# Patient Record
Sex: MALE | Race: WHITE | NOT HISPANIC OR LATINO | Employment: OTHER | URBAN - METROPOLITAN AREA
[De-identification: names, ages, dates, MRNs, and addresses within clinical notes are randomized per-mention and may not be internally consistent; named-entity substitution may affect disease eponyms.]

---

## 2017-08-07 ENCOUNTER — APPOINTMENT (OUTPATIENT)
Dept: RADIOLOGY | Facility: MEDICAL CENTER | Age: 72
DRG: 378 | End: 2017-08-07
Attending: EMERGENCY MEDICINE
Payer: MEDICARE

## 2017-08-07 ENCOUNTER — RESOLUTE PROFESSIONAL BILLING HOSPITAL PROF FEE (OUTPATIENT)
Dept: HOSPITALIST | Facility: MEDICAL CENTER | Age: 72
End: 2017-08-07
Payer: MEDICARE

## 2017-08-07 ENCOUNTER — HOSPITAL ENCOUNTER (INPATIENT)
Facility: MEDICAL CENTER | Age: 72
LOS: 2 days | DRG: 378 | End: 2017-08-09
Attending: EMERGENCY MEDICINE | Admitting: HOSPITALIST
Payer: MEDICARE

## 2017-08-07 DIAGNOSIS — K92.1 MELENA: ICD-10-CM

## 2017-08-07 DIAGNOSIS — K92.2 GASTROINTESTINAL HEMORRHAGE, UNSPECIFIED GASTROINTESTINAL HEMORRHAGE TYPE: ICD-10-CM

## 2017-08-07 PROBLEM — I10 HTN (HYPERTENSION): Status: ACTIVE | Noted: 2017-08-07

## 2017-08-07 PROBLEM — E78.5 DYSLIPIDEMIA: Status: ACTIVE | Noted: 2017-08-07

## 2017-08-07 PROBLEM — D62 ANEMIA DUE TO BLOOD LOSS, ACUTE: Status: ACTIVE | Noted: 2017-08-07

## 2017-08-07 LAB
ABO GROUP BLD: NORMAL
ABO GROUP BLD: NORMAL
ALBUMIN SERPL BCP-MCNC: 3.3 G/DL (ref 3.2–4.9)
ALBUMIN/GLOB SERPL: 1.4 G/DL
ALP SERPL-CCNC: 54 U/L (ref 30–99)
ALT SERPL-CCNC: 11 U/L (ref 2–50)
ANION GAP SERPL CALC-SCNC: 6 MMOL/L (ref 0–11.9)
APTT PPP: 21 SEC (ref 24.7–36)
AST SERPL-CCNC: 17 U/L (ref 12–45)
BARCODED ABORH UBTYP: 6200
BARCODED PRD CODE UBPRD: NORMAL
BARCODED UNIT NUM UBUNT: NORMAL
BASOPHILS # BLD AUTO: 0.3 % (ref 0–1.8)
BASOPHILS # BLD: 0.03 K/UL (ref 0–0.12)
BILIRUB SERPL-MCNC: 0.4 MG/DL (ref 0.1–1.5)
BLD GP AB SCN SERPL QL: NORMAL
BUN SERPL-MCNC: 34 MG/DL (ref 8–22)
CALCIUM SERPL-MCNC: 9.2 MG/DL (ref 8.5–10.5)
CHLORIDE SERPL-SCNC: 106 MMOL/L (ref 96–112)
CO2 SERPL-SCNC: 25 MMOL/L (ref 20–33)
COMPONENT R 8504R: NORMAL
CREAT SERPL-MCNC: 0.94 MG/DL (ref 0.5–1.4)
EOSINOPHIL # BLD AUTO: 0.11 K/UL (ref 0–0.51)
EOSINOPHIL NFR BLD: 1.1 % (ref 0–6.9)
ERYTHROCYTE [DISTWIDTH] IN BLOOD BY AUTOMATED COUNT: 45.5 FL (ref 35.9–50)
GFR SERPL CREATININE-BSD FRML MDRD: >60 ML/MIN/1.73 M 2
GLOBULIN SER CALC-MCNC: 2.4 G/DL (ref 1.9–3.5)
GLUCOSE SERPL-MCNC: 108 MG/DL (ref 65–99)
HCT VFR BLD AUTO: 21.8 % (ref 42–52)
HCT VFR BLD AUTO: 22.8 % (ref 42–52)
HGB BLD-MCNC: 7.1 G/DL (ref 14–18)
HGB BLD-MCNC: 7.4 G/DL (ref 14–18)
IMM GRANULOCYTES # BLD AUTO: 0.04 K/UL (ref 0–0.11)
IMM GRANULOCYTES NFR BLD AUTO: 0.4 % (ref 0–0.9)
INR PPP: 1.08 (ref 0.87–1.13)
LIPASE SERPL-CCNC: 31 U/L (ref 11–82)
LYMPHOCYTES # BLD AUTO: 2.12 K/UL (ref 1–4.8)
LYMPHOCYTES NFR BLD: 20.4 % (ref 22–41)
MCH RBC QN AUTO: 30.5 PG (ref 27–33)
MCHC RBC AUTO-ENTMCNC: 32.5 G/DL (ref 33.7–35.3)
MCV RBC AUTO: 93.8 FL (ref 81.4–97.8)
MONOCYTES # BLD AUTO: 0.97 K/UL (ref 0–0.85)
MONOCYTES NFR BLD AUTO: 9.3 % (ref 0–13.4)
NEUTROPHILS # BLD AUTO: 7.11 K/UL (ref 1.82–7.42)
NEUTROPHILS NFR BLD: 68.5 % (ref 44–72)
NRBC # BLD AUTO: 0 K/UL
NRBC BLD AUTO-RTO: 0 /100 WBC
PLATELET # BLD AUTO: 247 K/UL (ref 164–446)
PMV BLD AUTO: 10.5 FL (ref 9–12.9)
POTASSIUM SERPL-SCNC: 4 MMOL/L (ref 3.6–5.5)
PRODUCT TYPE UPROD: NORMAL
PROT SERPL-MCNC: 5.7 G/DL (ref 6–8.2)
PROTHROMBIN TIME: 14.3 SEC (ref 12–14.6)
RBC # BLD AUTO: 2.43 M/UL (ref 4.7–6.1)
RH BLD: NORMAL
SODIUM SERPL-SCNC: 137 MMOL/L (ref 135–145)
UNIT STATUS USTAT: NORMAL
WBC # BLD AUTO: 10.4 K/UL (ref 4.8–10.8)

## 2017-08-07 PROCEDURE — 700111 HCHG RX REV CODE 636 W/ 250 OVERRIDE (IP): Performed by: HOSPITALIST

## 2017-08-07 PROCEDURE — 85018 HEMOGLOBIN: CPT

## 2017-08-07 PROCEDURE — 700111 HCHG RX REV CODE 636 W/ 250 OVERRIDE (IP): Performed by: INTERNAL MEDICINE

## 2017-08-07 PROCEDURE — 83690 ASSAY OF LIPASE: CPT

## 2017-08-07 PROCEDURE — 96365 THER/PROPH/DIAG IV INF INIT: CPT

## 2017-08-07 PROCEDURE — 36415 COLL VENOUS BLD VENIPUNCTURE: CPT

## 2017-08-07 PROCEDURE — 700105 HCHG RX REV CODE 258: Performed by: HOSPITALIST

## 2017-08-07 PROCEDURE — 85610 PROTHROMBIN TIME: CPT

## 2017-08-07 PROCEDURE — 86901 BLOOD TYPING SEROLOGIC RH(D): CPT

## 2017-08-07 PROCEDURE — 85025 COMPLETE CBC W/AUTO DIFF WBC: CPT

## 2017-08-07 PROCEDURE — C9113 INJ PANTOPRAZOLE SODIUM, VIA: HCPCS | Performed by: INTERNAL MEDICINE

## 2017-08-07 PROCEDURE — 85730 THROMBOPLASTIN TIME PARTIAL: CPT

## 2017-08-07 PROCEDURE — 99285 EMERGENCY DEPT VISIT HI MDM: CPT

## 2017-08-07 PROCEDURE — 86850 RBC ANTIBODY SCREEN: CPT

## 2017-08-07 PROCEDURE — 770020 HCHG ROOM/CARE - TELE (206)

## 2017-08-07 PROCEDURE — 700105 HCHG RX REV CODE 258: Performed by: INTERNAL MEDICINE

## 2017-08-07 PROCEDURE — 99223 1ST HOSP IP/OBS HIGH 75: CPT | Mod: AI | Performed by: HOSPITALIST

## 2017-08-07 PROCEDURE — 86900 BLOOD TYPING SEROLOGIC ABO: CPT

## 2017-08-07 PROCEDURE — C9113 INJ PANTOPRAZOLE SODIUM, VIA: HCPCS | Performed by: HOSPITALIST

## 2017-08-07 PROCEDURE — 80053 COMPREHEN METABOLIC PANEL: CPT

## 2017-08-07 PROCEDURE — 85014 HEMATOCRIT: CPT

## 2017-08-07 PROCEDURE — 71010 DX-CHEST-LIMITED (1 VIEW): CPT

## 2017-08-07 RX ORDER — LISINOPRIL 10 MG/1
10 TABLET ORAL DAILY
COMMUNITY

## 2017-08-07 RX ORDER — AMOXICILLIN 250 MG
2 CAPSULE ORAL 2 TIMES DAILY
Status: DISCONTINUED | OUTPATIENT
Start: 2017-08-07 | End: 2017-08-09 | Stop reason: HOSPADM

## 2017-08-07 RX ORDER — IBUPROFEN 200 MG
1 CAPSULE ORAL DAILY
COMMUNITY

## 2017-08-07 RX ORDER — ATORVASTATIN CALCIUM 20 MG/1
20 TABLET, FILM COATED ORAL NIGHTLY
COMMUNITY

## 2017-08-07 RX ORDER — ATORVASTATIN CALCIUM 20 MG/1
20 TABLET, FILM COATED ORAL NIGHTLY
Status: DISCONTINUED | OUTPATIENT
Start: 2017-08-07 | End: 2017-08-09 | Stop reason: HOSPADM

## 2017-08-07 RX ORDER — ACETAMINOPHEN 325 MG/1
650 TABLET ORAL
Status: COMPLETED | OUTPATIENT
Start: 2017-08-07 | End: 2017-08-08

## 2017-08-07 RX ORDER — CHLORAL HYDRATE 500 MG
1000 CAPSULE ORAL 2 TIMES DAILY
COMMUNITY

## 2017-08-07 RX ORDER — M-VIT,TX,IRON,MINS/CALC/FOLIC 27MG-0.4MG
1 TABLET ORAL DAILY
COMMUNITY

## 2017-08-07 RX ORDER — POLYETHYLENE GLYCOL 3350 17 G/17G
1 POWDER, FOR SOLUTION ORAL
Status: DISCONTINUED | OUTPATIENT
Start: 2017-08-07 | End: 2017-08-09 | Stop reason: HOSPADM

## 2017-08-07 RX ORDER — DIPHENHYDRAMINE HYDROCHLORIDE 50 MG/ML
25 INJECTION INTRAMUSCULAR; INTRAVENOUS
Status: COMPLETED | OUTPATIENT
Start: 2017-08-07 | End: 2017-08-08

## 2017-08-07 RX ORDER — BISACODYL 10 MG
10 SUPPOSITORY, RECTAL RECTAL
Status: DISCONTINUED | OUTPATIENT
Start: 2017-08-07 | End: 2017-08-09 | Stop reason: HOSPADM

## 2017-08-07 RX ADMIN — SODIUM CHLORIDE 8 MG/HR: 9 INJECTION, SOLUTION INTRAVENOUS at 16:40

## 2017-08-07 RX ADMIN — SODIUM CHLORIDE 80 MG: 9 INJECTION, SOLUTION INTRAVENOUS at 12:58

## 2017-08-07 ASSESSMENT — ENCOUNTER SYMPTOMS
NAUSEA: 0
FEVER: 0
VOMITING: 0
CHILLS: 0

## 2017-08-07 ASSESSMENT — COPD QUESTIONNAIRES
HAVE YOU SMOKED AT LEAST 100 CIGARETTES IN YOUR ENTIRE LIFE: NO/DON'T KNOW
DURING THE PAST 4 WEEKS HOW MUCH DID YOU FEEL SHORT OF BREATH: NONE/LITTLE OF THE TIME
DO YOU EVER COUGH UP ANY MUCUS OR PHLEGM?: NO/ONLY WITH OCCASIONAL COLDS OR INFECTIONS
COPD SCREENING SCORE: 2

## 2017-08-07 ASSESSMENT — LIFESTYLE VARIABLES
ALCOHOL_USE: NO
EVER_SMOKED: NEVER
EVER_SMOKED: NEVER
DO YOU DRINK ALCOHOL: NO

## 2017-08-07 ASSESSMENT — PATIENT HEALTH QUESTIONNAIRE - PHQ9
SUM OF ALL RESPONSES TO PHQ QUESTIONS 1-9: 0
1. LITTLE INTEREST OR PLEASURE IN DOING THINGS: NOT AT ALL
2. FEELING DOWN, DEPRESSED, IRRITABLE, OR HOPELESS: NOT AT ALL
SUM OF ALL RESPONSES TO PHQ9 QUESTIONS 1 AND 2: 0

## 2017-08-07 ASSESSMENT — PAIN SCALES - GENERAL
PAINLEVEL_OUTOF10: 0
PAINLEVEL_OUTOF10: 5
PAINLEVEL_OUTOF10: 3
PAINLEVEL_OUTOF10: 5

## 2017-08-07 NOTE — ED NOTES
Med rec complete per Pt at bedside  [place] and [place] paper Rx  If Pt is discharged with any medication   Allergies reviewed  No ABX In last 30 days

## 2017-08-07 NOTE — IP AVS SNAPSHOT
" Home Care Instructions                                                                                                                  Name:Reyes Orourke  Medical Record Number:[id number]  CSN: [id number]    YOB: 1945   Age: 72 y.o.  Sex: male  HT:1.753 m (5' 9\") WT: 96.1 kg (211 lb 13.8 oz)          Admit Date: 8/7/2017     Discharge Date:   Today's Date: 8/9/2017  Attending Doctor:  Gregory Cotton M.D.                  Allergies:  Review of patient's allergies indicates no known allergies.            Discharge Instructions       Discharge Instructions    Discharged to home by car with relative. Discharged via wheelchair, hospital escort: Yes.  Special equipment needed: Not Applicable    Be sure to schedule a follow-up appointment with your primary care doctor or any specialists as instructed.     Discharge Plan:   Diet Plan: Discussed  Activity Level: Discussed  Confirmed Follow up Appointment: Patient to Call and Schedule Appointment  Confirmed Symptoms Management: Discussed  Medication Reconciliation Updated: Yes  Influenza Vaccine Indication: Patient Refuses    I understand that a diet low in cholesterol, fat, and sodium is recommended for good health. Unless I have been given specific instructions below for another diet, I accept this instruction as my diet prescription.   Other diet: Regular Diet    Special Instructions: None    · Is patient discharged on Warfarin / Coumadin?   No     · Is patient Post Blood Transfusion?  No    Depression / Suicide Risk    As you are discharged from this [facility] facility, it is important to learn how to keep safe from harming yourself.    Recognize the warning signs:  · Abrupt changes in personality, positive or negative- including increase in energy   · Giving away possessions  · Change in eating patterns- significant weight changes-  positive or negative  · Change in sleeping patterns- unable to sleep or sleeping all the time   · Unwillingness or inability " to communicate  · Depression  · Unusual sadness, discouragement and loneliness  · Talk of wanting to die  · Neglect of personal appearance   · Rebelliousness- reckless behavior  · Withdrawal from people/activities they love  · Confusion- inability to concentrate     If you or a loved one observes any of these behaviors or has concerns about self-harm, here's what you can do:  · Talk about it- your feelings and reasons for harming yourself  · Remove any means that you might use to hurt yourself (examples: pills, rope, extension cords, firearm)  · Get professional help from the community (Mental Health, Substance Abuse, psychological counseling)  · Do not be alone:Call your Safe Contact- someone whom you trust who will be there for you.  · Call your local CRISIS HOTLINE 538-0779 or 910-712-6868  · Call your local Children's Mobile Crisis Response Team [place] (698) 762-2115 or www.DwellGreen  · Call the toll free National Suicide Prevention Hotlines   · National Suicide Prevention Lifeline 207-068-ZODE (1190)  · National Hope Line Network 800-SUICIDE (468-4784)          Gastrointestinal Bleeding  Gastrointestinal (GI) bleeding means there is bleeding somewhere along the digestive tract, between the mouth and anus.  CAUSES   There are many different problems that can cause GI bleeding. Possible causes include:  · Esophagitis. This is inflammation, irritation, or swelling of the esophagus.  · Hemorrhoids. These are veins that are full of blood (engorged) in the rectum. They cause pain, inflammation, and may bleed.  · Anal fissures. These are areas of painful tearing which may bleed. They are often caused by passing hard stool.  · Diverticulosis. These are pouches that form on the colon over time, with age, and may bleed significantly.  · Diverticulitis. This is inflammation in areas with diverticulosis. It can cause pain, fever, and bloody stools, although bleeding is rare.  · Polyps and cancer. Colon cancer  often starts out as precancerous polyps.  · Gastritis and ulcers. Bleeding from the upper gastrointestinal tract (near the stomach) may travel through the intestines and produce black, sometimes tarry, often bad smelling stools. In certain cases, if the bleeding is fast enough, the stools may not be black, but red. This condition may be life-threatening.  SYMPTOMS   · Vomiting bright red blood or material that looks like coffee grounds.  · Bloody, black, or tarry stools.  DIAGNOSIS   Your caregiver may diagnose your condition by taking your history and performing a physical exam. More tests may be needed, including:  · X-rays and other imaging tests.  · Esophagogastroduodenoscopy (EGD). This test uses a flexible, lighted tube to look at your esophagus, stomach, and small intestine.  · Colonoscopy. This test uses a flexible, lighted tube to look at your colon.  TREATMENT   Treatment depends on the cause of your bleeding.   · For bleeding from the esophagus, stomach, small intestine, or colon, the caregiver doing your EGD or colonoscopy may be able to stop the bleeding as part of the procedure.  · Inflammation or infection of the colon can be treated with medicines.  · Many rectal problems can be treated with creams, suppositories, or warm baths.  · Surgery is sometimes needed.  · Blood transfusions are sometimes needed if you have lost a lot of blood.  If bleeding is slow, you may be allowed to go home. If there is a lot of bleeding, you will need to stay in the hospital for observation.  HOME CARE INSTRUCTIONS   · Take any medicines exactly as prescribed.  · Keep your stools soft by eating foods that are high in fiber. These foods include whole grains, legumes, fruits, and vegetables. Prunes (1 to 3 a day) work well for many people.  · Drink enough fluids to keep your urine clear or pale yellow.  SEEK IMMEDIATE MEDICAL CARE IF:   · Your bleeding increases.  · You feel lightheaded, weak, or you faint.  · You have  severe cramps in your back or abdomen.  · You pass large blood clots in your stool.  · Your problems are getting worse.  MAKE SURE YOU:   · Understand these instructions.  · Will watch your condition.  · Will get help right away if you are not doing well or get worse.     This information is not intended to replace advice given to you by your health care provider. Make sure you discuss any questions you have with your health care provider.     Document Released: 12/15/2001 Document Revised: 12/04/2013 Document Reviewed: 11/26/2012  [employer] Interactive Patient Education ©2016 [employer]          Follow-up Information     1. Follow up with Rogelio Martell. Schedule an appointment as soon as possible for a visit in 3 weeks.    Contact information    [phone]         Discharge Medication Instructions:    Below are the medications your physician expects you to take upon discharge:    Review all your home medications and newly ordered medications with your doctor and/or pharmacist. Follow medication instructions as directed by your doctor and/or pharmacist.    Please keep your medication list with you and share with your physician.               Medication List      START taking these medications        Instructions    Morning Afternoon Evening Bedtime    amoxicillin 500 MG Caps   Commonly known as:  AMOXIL        Take 2 Caps by mouth 2 times a day.   Dose:  1000 mg                        clarithromycin 500 MG Tabs   Commonly known as:  BIAXIN        Take 1 Tab by mouth 2 times a day.   Dose:  500 mg                        omeprazole 20 MG delayed-release capsule   Last time this was given:  20 mg on 8/9/2017  8:38 AM   Commonly known as:  PRILOSEC        Take 1 Cap by mouth 2 times a day.   Dose:  20 mg                          CONTINUE taking these medications        Instructions    Morning Afternoon Evening Bedtime    atorvastatin 20 MG Tabs   Last time this was given:  20 mg on 8/8/2017  8:59 PM   Commonly  known as:  LIPITOR        Take 20 mg by mouth every evening.   Dose:  20 mg                        Calcium 600 MG Tabs        Take 1 Tab by mouth every day.   Dose:  1 Tab                        fish oil 1000 MG Caps capsule        Take 1,000 mg by mouth 2 Times a Day.   Dose:  1000 mg                        lisinopril 10 MG Tabs   Commonly known as:  PRINIVIL        Take 10 mg by mouth every day.   Dose:  10 mg                        therapeutic multivitamin-minerals Tabs        Take 1 Tab by mouth every day.   Dose:  1 Tab                          STOP taking these medications     aspirin EC 81 MG Tbec   Commonly known as:  ECOTRIN                    Where to Get Your Medications      Information about where to get these medications is not yet available     ! Ask your nurse or doctor about these medications    - amoxicillin 500 MG Caps  - clarithromycin 500 MG Tabs  - omeprazole 20 MG delayed-release capsule            Instructions           Diet / Nutrition:    Follow any diet instructions given to you by your doctor or the dietician, including how much salt (sodium) you are allowed each day.    If you are overweight, talk to your doctor about a weight reduction plan.    Activity:    Remain physically active following your doctor's instructions about exercise and activity.    Rest often.     Any time you become even a little tired or short of breath, SIT DOWN and rest.    Worsening Symptoms:    Report any of the following signs and symptoms to the doctor's office immediately:    *Pain of jaw, arm, or neck  *Chest pain not relieved by medication                               *Dizziness or loss of consciousness  *Difficulty breathing even when at rest   *More tired than usual                                       *Bleeding drainage or swelling of surgical site  *Swelling of feet, ankles, legs or stomach                 *Fever (>100ºF)  *Pink or blood tinged sputum  *Weight gain (3lbs/day or 5lbs /week)              *Shock from internal defibrillator (if applicable)  *Palpitations or irregular heartbeats                *Cool and/or numb extremities    Stroke Awareness    Common Risk Factors for Stroke include:    Age  Atrial Fibrillation  Carotid Artery Stenosis  Diabetes Mellitus  Excessive alcohol consumption  High blood pressure  Overweight   Physical inactivity  Smoking    Warning signs and symptoms of a stroke include:    *Sudden numbness or weakness of the face, arm or leg (especially on one side of the body).  *Sudden confusion, trouble speaking or understanding.  *Sudden trouble seeing in one or both eyes.  *Sudden trouble walking, dizziness, loss of balance or coordination.Sudden severe headache with no known cause.    It is very important to get treatment quickly when a stroke occurs. If you experience any of the above warning signs, call 911 immediately.                   Disclaimer         Quit Smoking / Tobacco Use:    I understand the use of any tobacco products increases my chance of suffering from future heart disease or stroke and could cause other illnesses which may shorten my life. Quitting the use of tobacco products is the single most important thing I can do to improve my health. For further information on smoking / tobacco cessation call a Toll Free Quit Line at 1-663.459.3205 (*National [employer] [place]) or 1-453.292.3253 (American Lung Association) or you can access the web based program at www.lungusa.org.    [place] Tobacco Users Help Line:  (788) 197-9343       Toll Free: 1-794.213.7065  Quit Tobacco Program [facility] (481)095-2899    Crisis Hotline:    [place] Crisis Hotline:  6-419-QXDEACA or 1-211.404.5097    [place] Crisis Hotline:    1-236.588.3939 or 570-708-0874    Discharge Survey:   Thank you for choosing [facility]. We hope we did everything we could to make your hospital stay a pleasant one. You may be receiving a phone survey and we would appreciate your time  and participation in answering the questions. Your input is very valuable to us in our efforts to improve our service to our patients and their families.        My signature on this form indicates that:    1. I have reviewed and understand the above information.  2. My questions regarding this information have been answered to my satisfaction.  3. I have formulated a plan with my discharge nurse to obtain my prescribed medications for home.                  Disclaimer         __________________________________                     __________       ________                       Patient Signature                                                 Date                    Time

## 2017-08-07 NOTE — IP AVS SNAPSHOT
" <p align=\"LEFT\"><IMG SRC=\"[email or web address]\" alt=\"Image\" WIDTH=\"50%\" HEIGHT=\"200\" BORDER=\"\"></p>                   Name:Reyes Orourke  Medical Record Number:[id number]  CSN: [id number]    YOB: 1945   Age: 72 y.o.  Sex: male  HT:1.753 m (5' 9\") WT: 96.1 kg (211 lb 13.8 oz)          Admit Date: 8/7/2017     Discharge Date:   Today's Date: 8/9/2017  Attending Doctor:  Gregory Cotton M.D.                  Allergies:  Review of patient's allergies indicates no known allergies.          Follow-up Information     1. Follow up with Rogelio Martell. Schedule an appointment as soon as possible for a visit in 3 weeks.    Contact information    [phone]         Medication List      Take these Medications        Instructions    amoxicillin 500 MG Caps   Commonly known as:  AMOXIL    Take 2 Caps by mouth 2 times a day.   Dose:  1000 mg       atorvastatin 20 MG Tabs   Commonly known as:  LIPITOR    Take 20 mg by mouth every evening.   Dose:  20 mg       Calcium 600 MG Tabs    Take 1 Tab by mouth every day.   Dose:  1 Tab       clarithromycin 500 MG Tabs   Commonly known as:  BIAXIN    Take 1 Tab by mouth 2 times a day.   Dose:  500 mg       fish oil 1000 MG Caps capsule    Take 1,000 mg by mouth 2 Times a Day.   Dose:  1000 mg       lisinopril 10 MG Tabs   Commonly known as:  PRINIVIL    Take 10 mg by mouth every day.   Dose:  10 mg       omeprazole 20 MG delayed-release capsule   Commonly known as:  PRILOSEC    Take 1 Cap by mouth 2 times a day.   Dose:  20 mg       therapeutic multivitamin-minerals Tabs    Take 1 Tab by mouth every day.   Dose:  1 Tab         "

## 2017-08-07 NOTE — ASSESSMENT & PLAN NOTE
With black stools and the recent addition of Aleve now with a hemoglobin of 7. You have serial hemoglobins and an EGD in the morning. Place on IV Protonix. He will be transfused for hemoglobin of less than 7 or if he is symptomatic.

## 2017-08-07 NOTE — ED NOTES
Pt ambulates to triage  Chief Complaint   Patient presents with   • Bloody Stools     dark black for 1 wk   • Dizziness   pt visiting for hot august nights  Takes a baby asa daily  Pt asked to wait in lobby, pt updated on triage process and pt asked to inform RN of any changes.

## 2017-08-07 NOTE — ED NOTES
Pt & wife updated on plan of care to be admitted.  Resting comfortably without complaints, awaiting admission.

## 2017-08-07 NOTE — ED PROVIDER NOTES
"ED Provider Note    Scribed for Peterson Walsh M.D. by Jennifer Daniels. 8/7/2017  10:06 AM    Primary care provider: None  Means of arrival: Walk-in  History obtained from: Patient  History limited by: None    CHIEF COMPLAINT  Chief Complaint   Patient presents with   • Bloody Stools     dark black for 1 wk   • Dizziness       HPI  Reyes Orourke is a 72 y.o. male who presents to the Emergency Department for evaluation of bloody stools that have occurred for one week. The patient confirms he has had 4 episodes of black tarry stools in the last week. He has associated symptom of lightheadedness and experienced a near syncope episode two days ago. The patient recently saw a doctor for knee pain, but was diagnosed with a GI bleed. He was prescribed Aleve for treatment. The patient denies nausea, vomiting, and abdominal pain.     REVIEW OF SYSTEMS  Pertinent positives include bloody stools, lightheadedness, near syncope.   Pertinent negatives include no nausea, vomiting, or abdominal pain.    All other systems reviewed and negative.    C.     PAST MEDICAL HISTORY   has a past medical history of Hypertension and High cholesterol.    SURGICAL HISTORY  patient denies any surgical history    SOCIAL HISTORY    History   Drug Use No       FAMILY HISTORY  No pertinent family history    CURRENT MEDICATIONS  None noted.    ALLERGIES  No Known Allergies    PHYSICAL EXAM  VITAL SIGNS: /60 mmHg  Pulse 78  Temp(Src) 36.8 °C (98.2 °F) (Temporal)  Resp 20  Ht 1.753 m (5' 9\")  Wt 96.3 kg (212 lb 4.9 oz)  BMI 31.34 kg/m2  SpO2 100%    Nursing note and vitals reviewed.  Constitutional: Well-developed and well-nourished. No distress.   HENT: Head is normocephalic and atraumatic. Oropharynx is clear and moist without exudate or erythema.   Eyes: Pupils are equal, round, and reactive to light. Conjunctiva are normal.   Cardiovascular: Normal rate and regular rhythm. No murmur heard. Normal radial pulses.  Pulmonary/Chest: " Breath sounds normal. No wheezes or rales.   Abdominal: Soft and non-tender. No distention   Rectal: Positive for melena, Hemoccult strongly heme positive .  Musculoskeletal: Extremities exhibit normal range of motion without edema or tenderness.   Neurological: Awake, alert and oriented to person, place, and time. No focal deficits noted.  Skin: Skin is warm and dry. No rash.   Psychiatric: Normal mood and affect. Appropriate for clinical situation    DIAGNOSTIC STUDIES / PROCEDURES    LABS  Results for orders placed or performed during the hospital encounter of 08/07/17   COD (ADULT)   Result Value Ref Range    ABO Grouping Only A     Rh Grouping Only POS     Antibody Screen-Cod NEG    CBC WITH DIFFERENTIAL   Result Value Ref Range    WBC 10.4 4.8 - 10.8 K/uL    RBC 2.43 (L) 4.70 - 6.10 M/uL    Hemoglobin 7.4 (L) 14.0 - 18.0 g/dL    Hematocrit 22.8 (L) 42.0 - 52.0 %    MCV 93.8 81.4 - 97.8 fL    MCH 30.5 27.0 - 33.0 pg    MCHC 32.5 (L) 33.7 - 35.3 g/dL    RDW 45.5 35.9 - 50.0 fL    Platelet Count 247 164 - 446 K/uL    MPV 10.5 9.0 - 12.9 fL    Neutrophils-Polys 68.50 44.00 - 72.00 %    Lymphocytes 20.40 (L) 22.00 - 41.00 %    Monocytes 9.30 0.00 - 13.40 %    Eosinophils 1.10 0.00 - 6.90 %    Basophils 0.30 0.00 - 1.80 %    Immature Granulocytes 0.40 0.00 - 0.90 %    Nucleated RBC 0.00 /100 WBC    Neutrophils (Absolute) 7.11 1.82 - 7.42 K/uL    Lymphs (Absolute) 2.12 1.00 - 4.80 K/uL    Monos (Absolute) 0.97 (H) 0.00 - 0.85 K/uL    Eos (Absolute) 0.11 0.00 - 0.51 K/uL    Baso (Absolute) 0.03 0.00 - 0.12 K/uL    Immature Granulocytes (abs) 0.04 0.00 - 0.11 K/uL    NRBC (Absolute) 0.00 K/uL   COMP METABOLIC PANEL   Result Value Ref Range    Sodium 137 135 - 145 mmol/L    Potassium 4.0 3.6 - 5.5 mmol/L    Chloride 106 96 - 112 mmol/L    Co2 25 20 - 33 mmol/L    Anion Gap 6.0 0.0 - 11.9    Glucose 108 (H) 65 - 99 mg/dL    Bun 34 (H) 8 - 22 mg/dL    Creatinine 0.94 0.50 - 1.40 mg/dL    Calcium 9.2 8.5 - 10.5 mg/dL     AST(SGOT) 17 12 - 45 U/L    ALT(SGPT) 11 2 - 50 U/L    Alkaline Phosphatase 54 30 - 99 U/L    Total Bilirubin 0.4 0.1 - 1.5 mg/dL    Albumin 3.3 3.2 - 4.9 g/dL    Total Protein 5.7 (L) 6.0 - 8.2 g/dL    Globulin 2.4 1.9 - 3.5 g/dL    A-G Ratio 1.4 g/dL   LIPASE   Result Value Ref Range    Lipase 31 11 - 82 U/L   PROTHROMBIN TIME   Result Value Ref Range    PT 14.3 12.0 - 14.6 sec    INR 1.08 0.87 - 1.13   APTT   Result Value Ref Range    APTT 21.0 (L) 24.7 - 36.0 sec   ESTIMATED GFR   Result Value Ref Range    GFR If African American >60 >60 mL/min/1.73 m 2    GFR If Non African American >60 >60 mL/min/1.73 m 2     All labs reviewed by me.    RADIOLOGY  DX-CHEST-LIMITED (1 VIEW)   Final Result      1.  No acute cardiac or pulmonary abnormalities are identified.        The radiologist's interpretation of all radiological studies have been reviewed by me.    COURSE & MEDICAL DECISION MAKING  Nursing notes, VS, PMSFHx reviewed in chart.       10:06 AM - Patient seen and examined at bedside. Patient will be treated with Protonix 80 mg IV. Ordered DX-Chest, COD, CBC,CMP, Lipase, PTT, APTT, estimated GFR, and ABO confirmation to evaluate his symptoms. The differential diagnoses include but are not limited to: GI bleed.     12:05 PM Consulted with hosptialist, Dr. Cunha and discussed patient's case. He will admit the patient.     12:08 PM - Paged GI.    12:16 PM - Consulted with Dr. Gooden about the patient's case who agrees to exam patient.    The patient presents today with a lower GI bleed. He has significantly decreased hemoglobin at 7.4. I do not have a prior value for comparison but givin his history I feel this is likely acute.        CRITICAL CARE  I provided critical care services, which included medication orders, frequent reevaluations of the patient's condition and response to treatment, ordering and reviewing test results, and discussing the case with various consultants.  The critical care time associated  with the care of the patient was 35 minutes. Review chart for interventions. This time is exclusive of any other billable procedures.        DISPOSITION:  Patient will be admitted to hospitalist, Dr. Cunha in guarded condition.    FINAL IMPRESSION  1. Gastrointestinal hemorrhage, unspecified gastrointestinal hemorrhage type    2. Melena         Total critical care time of 35 minutes as outlined above.     Jennifer SOTO (Scribe), am scribing for, and in the presence of, Peterson Walsh M.D..    Electronically signed by: Jennifer Daniels (Scribe), 8/7/2017    Peterson SOTO M.D. personally performed the services described in this documentation, as scribed by Jennifer Daniels in my presence, and it is both accurate and complete.    The note accurately reflects work and decisions made by me.  Peterson Waslh  8/7/2017  1:36 PM

## 2017-08-07 NOTE — IP AVS SNAPSHOT
8/9/2017    Reyes Orourke  [address] [facility] [address]    Dear Reyes:    [facility] wants to ensure your discharge home is safe and you or your loved ones have had all of your questions answered regarding your care after you leave the hospital.    Below is a list of resources and contact information should you have any questions regarding your hospital stay, follow-up instructions, or active medical symptoms.    Questions or Concerns Regarding… Contact   Medical Questions Related to Your Discharge  (7 days a week, 8am-5pm) Contact a Nurse Care Coordinator   [phone]   Medical Questions Not Related to Your Discharge  (24 hours a day / 7 days a week)  Contact the Nurse Health Line   [phone]    Medications or Discharge Instructions Refer to your discharge packet   or contact your [facility] Primary Care Provider   [phone]   Follow-up Appointment(s) Schedule your appointment via [employer]   or contact Scheduling [phone]   Billing Review your statement via [employer]  or contact Billing [phone]   Medical Records Review your records via [employer]   or contact Medical Records [phone]     You may receive a telephone call within two days of discharge. This call is to make certain you understand your discharge instructions and have the opportunity to have any questions answered. You can also easily access your medical information, test results and upcoming appointments via the [employer] free online health management tool. You can learn more and sign up at [employer]. For assistance setting up your [employer] account, please call [phone].    Once again, we want to ensure your discharge home is safe and that you have a clear understanding of any next steps in your care. If you have any questions or concerns, please do not hesitate to contact us, we are here for you. Thank you for choosing [facility] for your healthcare needs.    Sincerely,    Your [facility] Healthcare Team

## 2017-08-07 NOTE — IP AVS SNAPSHOT
Paytrail Access Code: [email or web address]  Expires: 9/8/2017  1:00 PM    Paytrail  A secure, online tool to manage your health information     [employer]’s Paytrail® is a secure, online tool that connects you to your personalized health information from the privacy of your home -- day or night - making it very easy for you to manage your healthcare. Once the activation process is completed, you can even access your medical information using the Paytrail [place], which is available for free in the [employer] store or Google Play store.     Paytrail provides the following levels of access (as shown below):   My Chart Features   [facility] Primary Care Doctor [facility]  Specialists [facility]  Urgent  Care Non-[facility]  Primary Care  Doctor   Email your healthcare team securely and privately 24/7 X X X X   Manage appointments: schedule your next appointment; view details of past/upcoming appointments X      Request prescription refills. X      View recent personal medical records, including lab and immunizations X X X X   View health record, including health history, allergies, medications X X X X   Read reports about your outpatient visits, procedures, consult and ER notes X X X X   See your discharge summary, which is a recap of your hospital and/or ER visit that includes your diagnosis, lab results, and care plan. X X       How to register for Paytrail:  1. Go to  [email or web address].  2. Click on the Sign Up Now box, which takes you to the New Member Sign Up page. You will need to provide the following information:  a. Enter your Paytrail Access Code exactly as it appears at the top of this page. (You will not need to use this code after you’ve completed the sign-up process. If you do not sign up before the expiration date, you must request a new code.)   b. Enter your date of birth.   c. Enter your home email address.   d. Click Submit, and follow the next screen’s instructions.  3. Create a Paytrail ID. This will be your Paytrail  login ID and cannot be changed, so think of one that is secure and easy to remember.  4. Create a [employer] password. You can change your password at any time.  5. Enter your Password Reset Question and Answer. This can be used at a later time if you forget your password.   6. Enter your e-mail address. This allows you to receive e-mail notifications when new information is available in [employer].  7. Click Sign Up. You can now view your health information.    For assistance activating your [employer] account, call [phone]

## 2017-08-07 NOTE — H&P
Hospital Medicine History and Physical    Date of Service  8/7/2017    Chief Complaint  Chief Complaint   Patient presents with   • Bloody Stools     dark black for 1 wk   • Dizziness       History of Presenting Illness  72 y.o. male who presented 8/7/2017 with black stools and dizziness. Mr. Orourke has a history of hypertension and dyslipidemia that was recently started on Aleve for his primary care provider as of arthritis type pain in his knees and he has been on this approximately 1 week. He noted that the past week is also experiencing black stools for the past 2 days is being quite dizzy specially when standing. Today his symptoms worsened and he presented to the emergency room where his hemoglobin was found to be 7. Will be admitted to the telemetry floor for close monitoring, serial hemoglobins, and gastroenterology consultation. He has been placed on a Protonix drip and will be prepared for an EGD in the morning.    Mr. Orourke came from [place] for [place] and will show his 1969 GTO.     Primary Care Physician  No primary care provider on file.    Consultants  Giacomo, GI    Code Status  full    Review of Systems  Review of Systems   Constitutional: Negative for fever and chills.   Cardiovascular: Negative for chest pain and leg swelling.   Gastrointestinal: Negative for nausea and vomiting.        Black stools for a week   Neurological:        Dizziness and light-headed for a the past 2 days.   All other systems reviewed and are negative.         Past Medical History  Past Medical History   Diagnosis Date   • Hypertension    • High cholesterol    no history of CAD, CVA, DM, nor previous GI bleed    Surgical History  No past surgical history on file.  Achilles tendon repair  Medications  No current facility-administered medications on file prior to encounter.     No current outpatient prescriptions on file prior to encounter.       Family History  No family history on file.  No family history  of CAD  His father had CHF  Social History  Social History   Substance Use Topics   • Smoking status: Not on file   • Smokeless tobacco: Not on file   • Alcohol Use: Not on file   He lives in [place]    Allergies  No Known Allergies     Physical Exam  Laboratory   Hemodynamics  Temp (24hrs), Av.8 °C (98.2 °F), Min:36.8 °C (98.2 °F), Max:36.8 °C (98.2 °F)   Temperature: 36.8 °C (98.2 °F)  Pulse  Av.7  Min: 71  Max: 78 Heart Rate (Monitored): 71  Blood Pressure : 125/58 mmHg, NIBP: 118/53 mmHg      Respiratory      Respiration: 16, Pulse Oximetry: 98 %             Physical Exam   Constitutional: He is oriented to person, place, and time. No distress.   Eyes:   Pale conjuntiva   Neck: Neck supple.   Cardiovascular: Normal rate and regular rhythm.    No murmur heard.  Pulmonary/Chest: Effort normal. No respiratory distress. He has no wheezes.   Abdominal: Soft. He exhibits no distension. There is no tenderness.   Musculoskeletal: He exhibits no edema or tenderness.   Neurological: He is alert and oriented to person, place, and time.   Skin: He is not diaphoretic. There is pallor.   Psychiatric: He has a normal mood and affect. His behavior is normal.       Recent Labs      17   1040   WBC  10.4   RBC  2.43*   HEMOGLOBIN  7.4*   HEMATOCRIT  22.8*   MCV  93.8   MCH  30.5   MCHC  32.5*   RDW  45.5   PLATELETCT  247   MPV  10.5     Recent Labs      17   1040   SODIUM  137   POTASSIUM  4.0   CHLORIDE  106   CO2  25   GLUCOSE  108*   BUN  34*   CREATININE  0.94   CALCIUM  9.2     Recent Labs      17   1040   ALTSGPT  11   ASTSGOT  17   ALKPHOSPHAT  54   TBILIRUBIN  0.4   LIPASE  31   GLUCOSE  108*     Recent Labs      17   1040   APTT  21.0*   INR  1.08             No results found for: TROPONINI    Imaging  DX-CHEST-LIMITED (1 VIEW)   Final Result      1.  No acute cardiac or pulmonary abnormalities are identified.           Assessment/Plan     I anticipate this patient will require at least  two midnights for appropriate medical management, necessitating inpatient admission.    Upper GI bleed (present on admission)  Assessment & Plan  With black stools and the recent addition of Aleve now with a hemoglobin of 7. You have serial hemoglobins and an EGD in the morning. Place on IV Protonix. He will be transfused for hemoglobin of less than 7 or if he is symptomatic.    Anemia due to blood loss, acute (present on admission)  Assessment & Plan  He may require transfusion    HTN (hypertension) (present on admission)  Assessment & Plan  We'll hold on his outpatient lisinopril due to symptomatic anemia    Dyslipidemia (present on admission)  Assessment & Plan  Continue his statin        VTE prophylaxis: SCDs.

## 2017-08-08 LAB
HCT VFR BLD AUTO: 20.7 % (ref 42–52)
HCT VFR BLD AUTO: 22.4 % (ref 42–52)
HCT VFR BLD AUTO: 23.6 % (ref 42–52)
HCT VFR BLD AUTO: 23.9 % (ref 42–52)
HCT VFR BLD AUTO: 24.6 % (ref 42–52)
HGB BLD-MCNC: 6.8 G/DL (ref 14–18)
HGB BLD-MCNC: 7.2 G/DL (ref 14–18)
HGB BLD-MCNC: 7.7 G/DL (ref 14–18)
HGB BLD-MCNC: 7.8 G/DL (ref 14–18)
HGB BLD-MCNC: 8.1 G/DL (ref 14–18)

## 2017-08-08 PROCEDURE — 85014 HEMATOCRIT: CPT | Mod: 91

## 2017-08-08 PROCEDURE — C9113 INJ PANTOPRAZOLE SODIUM, VIA: HCPCS | Performed by: HOSPITALIST

## 2017-08-08 PROCEDURE — 160203 HCHG ENDO MINUTES - 1ST 30 MINS LEVEL 4: Performed by: INTERNAL MEDICINE

## 2017-08-08 PROCEDURE — 86923 COMPATIBILITY TEST ELECTRIC: CPT

## 2017-08-08 PROCEDURE — A9270 NON-COVERED ITEM OR SERVICE: HCPCS | Performed by: INTERNAL MEDICINE

## 2017-08-08 PROCEDURE — 500066 HCHG BITE BLOCK, ECT: Performed by: INTERNAL MEDICINE

## 2017-08-08 PROCEDURE — 160035 HCHG PACU - 1ST 60 MINS PHASE I: Performed by: INTERNAL MEDICINE

## 2017-08-08 PROCEDURE — 700111 HCHG RX REV CODE 636 W/ 250 OVERRIDE (IP): Performed by: INTERNAL MEDICINE

## 2017-08-08 PROCEDURE — 88312 SPECIAL STAINS GROUP 1: CPT

## 2017-08-08 PROCEDURE — 160002 HCHG RECOVERY MINUTES (STAT): Performed by: INTERNAL MEDICINE

## 2017-08-08 PROCEDURE — 770020 HCHG ROOM/CARE - TELE (206)

## 2017-08-08 PROCEDURE — 700105 HCHG RX REV CODE 258

## 2017-08-08 PROCEDURE — 0DB68ZX EXCISION OF STOMACH, VIA NATURAL OR ARTIFICIAL OPENING ENDOSCOPIC, DIAGNOSTIC: ICD-10-PCS | Performed by: INTERNAL MEDICINE

## 2017-08-08 PROCEDURE — 700111 HCHG RX REV CODE 636 W/ 250 OVERRIDE (IP)

## 2017-08-08 PROCEDURE — 88305 TISSUE EXAM BY PATHOLOGIST: CPT

## 2017-08-08 PROCEDURE — 700102 HCHG RX REV CODE 250 W/ 637 OVERRIDE(OP): Performed by: INTERNAL MEDICINE

## 2017-08-08 PROCEDURE — P9016 RBC LEUKOCYTES REDUCED: HCPCS

## 2017-08-08 PROCEDURE — 85018 HEMOGLOBIN: CPT | Mod: 91

## 2017-08-08 PROCEDURE — 700102 HCHG RX REV CODE 250 W/ 637 OVERRIDE(OP): Performed by: HOSPITALIST

## 2017-08-08 PROCEDURE — 30233N1 TRANSFUSION OF NONAUTOLOGOUS RED BLOOD CELLS INTO PERIPHERAL VEIN, PERCUTANEOUS APPROACH: ICD-10-PCS | Performed by: INTERNAL MEDICINE

## 2017-08-08 PROCEDURE — A9270 NON-COVERED ITEM OR SERVICE: HCPCS | Performed by: HOSPITALIST

## 2017-08-08 PROCEDURE — 700111 HCHG RX REV CODE 636 W/ 250 OVERRIDE (IP): Performed by: HOSPITALIST

## 2017-08-08 PROCEDURE — 99232 SBSQ HOSP IP/OBS MODERATE 35: CPT | Performed by: INTERNAL MEDICINE

## 2017-08-08 PROCEDURE — 36430 TRANSFUSION BLD/BLD COMPNT: CPT

## 2017-08-08 PROCEDURE — 502240 HCHG MISC OR SUPPLY RC 0272: Performed by: INTERNAL MEDICINE

## 2017-08-08 PROCEDURE — 700105 HCHG RX REV CODE 258: Performed by: HOSPITALIST

## 2017-08-08 PROCEDURE — 36415 COLL VENOUS BLD VENIPUNCTURE: CPT

## 2017-08-08 PROCEDURE — 160048 HCHG OR STATISTICAL LEVEL 1-5: Performed by: INTERNAL MEDICINE

## 2017-08-08 RX ORDER — OMEPRAZOLE 20 MG/1
20 CAPSULE, DELAYED RELEASE ORAL 2 TIMES DAILY
Status: DISCONTINUED | OUTPATIENT
Start: 2017-08-08 | End: 2017-08-09 | Stop reason: HOSPADM

## 2017-08-08 RX ORDER — MIDAZOLAM HYDROCHLORIDE 1 MG/ML
.5-2 INJECTION INTRAMUSCULAR; INTRAVENOUS PRN
Status: ACTIVE | OUTPATIENT
Start: 2017-08-08 | End: 2017-08-08

## 2017-08-08 RX ORDER — SODIUM CHLORIDE 9 MG/ML
INJECTION, SOLUTION INTRAVENOUS
Status: COMPLETED
Start: 2017-08-08 | End: 2017-08-08

## 2017-08-08 RX ORDER — MIDAZOLAM HYDROCHLORIDE 1 MG/ML
INJECTION INTRAMUSCULAR; INTRAVENOUS
Status: DISCONTINUED | OUTPATIENT
Start: 2017-08-08 | End: 2017-08-08 | Stop reason: HOSPADM

## 2017-08-08 RX ORDER — SODIUM CHLORIDE 9 MG/ML
500 INJECTION, SOLUTION INTRAVENOUS
Status: ACTIVE | OUTPATIENT
Start: 2017-08-08 | End: 2017-08-08

## 2017-08-08 RX ADMIN — OMEPRAZOLE 20 MG: 20 CAPSULE, DELAYED RELEASE ORAL at 20:59

## 2017-08-08 RX ADMIN — ATORVASTATIN CALCIUM 20 MG: 20 TABLET, FILM COATED ORAL at 20:59

## 2017-08-08 RX ADMIN — SODIUM CHLORIDE 8 MG/HR: 9 INJECTION, SOLUTION INTRAVENOUS at 03:04

## 2017-08-08 RX ADMIN — DIPHENHYDRAMINE HYDROCHLORIDE 25 MG: 50 INJECTION, SOLUTION INTRAMUSCULAR; INTRAVENOUS at 00:34

## 2017-08-08 RX ADMIN — SODIUM CHLORIDE 500 ML: 9 INJECTION, SOLUTION INTRAVENOUS at 00:32

## 2017-08-08 RX ADMIN — ACETAMINOPHEN 650 MG: 325 TABLET, FILM COATED ORAL at 00:33

## 2017-08-08 ASSESSMENT — PAIN SCALES - GENERAL
PAINLEVEL_OUTOF10: 0

## 2017-08-08 ASSESSMENT — ENCOUNTER SYMPTOMS
COUGH: 0
ABDOMINAL PAIN: 0
DIZZINESS: 0
MYALGIAS: 0
DEPRESSION: 0
HEARTBURN: 0
HEADACHES: 0
FEVER: 0
BLURRED VISION: 0

## 2017-08-08 NOTE — CARE PLAN
Problem: Nutritional:  Goal: Achieve adequate nutritional intake  Patient will consume 50% of meals  Intervention: Advance diet as tolerated  Pt is NPO at this time.

## 2017-08-08 NOTE — PROGRESS NOTES
[facility] Hospitalist Progress Note    Date of Service: 2017    Chief Complaint  72 y.o. male admitted 2017 with black stools for 1 week.    Interval Problem Update  S/p EGD. Feels better with last melena 2 days ago.  Denies abdom pain.    Consultants/Specialty  GI    Disposition  Home when stable        Review of Systems   Constitutional: Negative for fever.   Eyes: Negative for blurred vision.   Respiratory: Negative for cough.    Cardiovascular: Negative for chest pain.   Gastrointestinal: Negative for heartburn and abdominal pain.   Genitourinary: Negative for dysuria.   Musculoskeletal: Negative for myalgias.   Skin: Negative for rash.   Neurological: Negative for dizziness and headaches.   Psychiatric/Behavioral: Negative for depression.      Physical Exam  Laboratory/Imaging   Hemodynamics  Temp (24hrs), Av.7 °C (98.1 °F), Min:36.3 °C (97.3 °F), Max:37.1 °C (98.7 °F)   Temperature: 36.4 °C (97.5 °F)  Pulse  Av.2  Min: 66  Max: 83 Heart Rate (Monitored): 67  Blood Pressure : 117/57 mmHg, NIBP: 133/70 mmHg      Respiratory      Respiration: 18, Pulse Oximetry: 98 %        RUL Breath Sounds: Clear, RML Breath Sounds: Clear, RLL Breath Sounds: Clear, JALEESA Breath Sounds: Clear, LLL Breath Sounds: Clear    Fluids    Intake/Output Summary (Last 24 hours) at 17 1527  Last data filed at 17 0330   Gross per 24 hour   Intake    672 ml   Output      0 ml   Net    672 ml       Nutrition  Orders Placed This Encounter   Procedures   • DIET ORDER     Standing Status: Standing      Number of Occurrences: 1      Standing Expiration Date:      Order Specific Question:  Diet:     Answer:  Regular [1]     Physical Exam   Constitutional: He is oriented to person, place, and time. No distress.   HENT:   Head: Normocephalic.   Eyes: EOM are normal.   Neck: Neck supple.   Cardiovascular: Normal rate and regular rhythm.    Pulmonary/Chest: Effort normal and breath sounds normal.   Abdominal: Soft. Bowel sounds  are normal. He exhibits no distension. There is no tenderness.   Musculoskeletal: He exhibits no edema.   Neurological: He is alert and oriented to person, place, and time.   Skin: Skin is warm.   Psychiatric: His behavior is normal.       Recent Labs      08/07/17   1040   08/07/17   2330  08/08/17   0502  08/08/17   1047   WBC  10.4   --    --    --    --    RBC  2.43*   --    --    --    --    HEMOGLOBIN  7.4*   < >  6.8*  7.2*  7.8*   HEMATOCRIT  22.8*   < >  20.7*  22.4*  23.6*   MCV  93.8   --    --    --    --    MCH  30.5   --    --    --    --    MCHC  32.5*   --    --    --    --    RDW  45.5   --    --    --    --    PLATELETCT  247   --    --    --    --    MPV  10.5   --    --    --    --     < > = values in this interval not displayed.     Recent Labs      08/07/17   1040   SODIUM  137   POTASSIUM  4.0   CHLORIDE  106   CO2  25   GLUCOSE  108*   BUN  34*   CREATININE  0.94   CALCIUM  9.2     Recent Labs      08/07/17   1040   APTT  21.0*   INR  1.08                  Assessment/Plan     Upper GI bleed (present on admission)  Assessment & Plan  With black stools and the recent addition of Aleve now with a hemoglobin of 7. EGD showed clean based duondenal ulcer. Just had transfusion for 1 unit PRBC earlier today.  H/H improving and no e/o active bleeding.  Cont to monitor and plan to dc tomorrow if stable.  PO PPI.    Anemia due to blood loss, acute (present on admission)  Assessment & Plan  As above    HTN (hypertension) (present on admission)  Assessment & Plan  We'll hold on his outpatient lisinopril due to symptomatic anemia    Dyslipidemia (present on admission)  Assessment & Plan  Continue his statin    Core Measures

## 2017-08-08 NOTE — CONSULTS
DATE OF SERVICE:  08/07/2017    REFERRING PHYSICIAN:  Peterson Walsh MD    REASON FOR THE CONSULT:  Melena.    HISTORY OF PRESENT ILLNESS:  The patient is a delightful 72-year-old male from   [place] who is here to show his car for [place].  He was started on   Aleve about 1 week ago for arthritis in his knees.  He also takes a daily baby   aspirin.  He has been having dark stools also for about the past 1 week and   started becoming dizzy about 2 days ago.  He denies symptoms of reflux, does   not take antacids at home.  He denies peptic ulcer disease.  He does not have   dysphagia and he denies abdominal pain.  His last colonoscopy was about 8   years ago and he did not have any polyps.  He denies any change in his bowel   habits.  He is overall healthy male.    PAST MEDICAL HISTORY:   ALLERGIES:  No known allergies.    MEDICATIONS:  Atorvastatin and lisinopril.    SURGERIES:  Repair of Achilles tendon.    MEDICAL ILLNESSES:  Hypertension, dyslipidemia.    SOCIAL HISTORY:  He is a nonsmoker.  No alcohol.    FAMILY HISTORY:  Brother had colon cancer in his 70s.    REVIEW OF SYSTEMS:    GENERAL:  No fevers, chills.  No nausea, vomiting, no chest pain, no shortness   of breath.  GASTROINTESTINAL:  As above.    GENITOURINARY:  No dysuria, no hematuria.  MUSCULOSKELETAL:  Joint pain in his knees.  NEUROLOGIC:  No syncope, seizure, or stroke.    ENDOCRINE:  No diabetes.    PHYSICAL EXAMINATION:  VITAL SIGNS:  Temperature 36.8, pulse 78, respirations 14 and blood pressure   125/77.  GENERAL:  This is a very healthy-appearing 72-year-old male in no acute   distress.  HEENT:  Pupils equal, round, react to light.  Sclerae are anicteric.    Conjunctivae pale.  No oral lesions.  NECK:  Soft, no adenopathy.  No thyromegaly.  LUNGS:  Clear to auscultation.  CARDIOVASCULAR:  S1, S2, without murmur, gallop or rub.  ABDOMEN:  Bowel sounds present, soft, nontender, no masses, no   hepatosplenomegaly.  RECTAL:   Deferred.  EXTREMITIES:  No clubbing, cyanosis or peripheral edema.  SKIN:  Smooth, moist, and warm.  NEUROLOGIC:  He is awake, alert, oriented, moving all extremities.    LABORATORY DATA:  Hemoglobin 7.1, hematocrit 21.8, BUN 34, creatinine 0.94,   INR 1.08.    IMPRESSION:  1.  Melena, most likely related to recent NSAIDs.  2.  Anemia, acute blood loss.  3.  Hypertension.  4.  Dyslipidemia.    RECOMMENDATIONS:  1.  At this time, the endoscopy schedule is full tomorrow, but we will call to   see if openings in the morning.  2.  N.p.o. at midnight.  3.  Clear liquid diet up until midnight.  4.  Serial H and H and transfuse hemoglobin less than 7.  5.  Pantoprazole drip.  6.  Hold all NSAIDs.       ____________________________________     MD MIKE DUMONT / SYDNI    DD:  08/07/2017 18:30:39  DT:  08/07/2017 18:42:41    D#:  [id number]  Job#:  [id number]

## 2017-08-08 NOTE — PROGRESS NOTES
Pt arrived to unit from ER. Pt is AOx4, NAD. C/O black, tarry stools for 1+ week, loss of appetite for 3+ days, and some dizziness. Denies CP, SOB, and N&V, at this time. GI consulted in ER, and and EGD is being scheduled for the morning. Pt denies any streaking or bright red blood in stools. Started on Protonix gtt. Making NPO @ midnight. Bed low and locked, alarm on, call bell within reach. Tele.

## 2017-08-08 NOTE — PROGRESS NOTES
Bedside report received. Pt is awake in bed, NAD, AOx4. 1 unit PRBC overnight, continued q6 Hgb. Anticipating need for further unit, calling MD. Pt denies pain in chest or ABD, SOB, or N&V, at this time. No stools overnight. Dizziness has resolved. Pt has ambulated to bathroom multiple times, without incident. Bed low and locked, alarm on, call bell within reach. Tele.

## 2017-08-08 NOTE — PROCEDURES
DATE OF SERVICE:  08/08/2017    PROCEDURE:  EGD with biopsy report.    ENDOSCOPIST:  Rusty Quintanilla MD    INDICATION:  Melena.    MEDICATIONS:  Patient received fentanyl 75 mcg IV and Versed 4 mg IV   throughout the procedure.    Procedure start time was 1202 and end time was 1218 for sedation time.    DESCRIPTION OF PROCEDURE:  Patient was explained in detail the indications as   well as the risks, the benefits, the alternatives of the procedure, he   indicates understanding of all of this and agrees to proceed.  Consent is   signed and placed on the chart.  After patient was deemed adequately sedated,   a standard Olympus gastroscope was lubricated and gently placed through a bite   block over top of tongue down into his esophagus.  From here, the scope was   carefully maneuvered through the esophagus into the stomach and any residual   fluid was thoroughly suctioned.  The scope was then maneuvered through the   body of the stomach, the pylorus and into the duodenum and the farthest reach   of the endoscope was second portion of the duodenum.  No abnormalities were   noted in the second portion of the duodenum; however, in the duodenal bulb, a   large clean based ulcer was noted.  Careful inspection did not reveal any high   risk stigmata of recent hemorrhage.  There was no active bleeding, there were   no blood clots or old blood within the stomach area as well.  The scope was   then withdrawn back into the stomach and biopsies were obtained to rule out H.   pylori, retroflexion was performed, no gastric abnormalities were noted.  The   scope was then withdrawn back into the esophagus and a careful inspection   revealed no abnormalities.  Scope was then withdrawn and the procedure   terminated.    FINDINGS:  1.  Duodenal bulb ulcer -- clean based.  2.  No evidence of recent bleeding.  3.  Otherwise, normal.    COMPLICATIONS:  None.    TISSUE AND BIOPSIES:  Gastric biopsies, rule out H. pylori.    THERAPY:   None.    IMPRESSION/PLAN/MEDICAL DECISION MAKIN.  Melena, clearly the cause of this melena is the ulcer that was found in   the duodenal bulb.  Whether or not this is secondary to the excess NSAID he   has been taking over the last couple of weeks or whether or not there is also   an H. pylori infection remains to be seen.  We will wait for the biopsy   results and I suspect he is okay for discharge, hence I will send him a letter   with the results after we obtained them.  I think he is okay for discharge,   he will need to stay on twice daily proton pump inhibitors 30 minutes before   breakfast and dinner and hold all NSAIDs for at least a couple of weeks.  2.  Duodenal ulcer with hemorrhage.  3.  Anemia, acute blood loss.  4.  Hypertension.  5.  Hyperlipidemia.       ____________________________________     MD AMANUEL OCHOA / SYDNI    DD:  2017 12:36:05  DT:  2017 13:23:10    D#:  [id number]  Job#:  [id number]

## 2017-08-08 NOTE — OR SURGEON
Operative Report    PreOp Diagnosis: Melena  PostOp Diagnosis: Duodenal ulcer    Procedure(s):  GASTROSCOPY-ENDO - Wound Class: Clean Contaminated    Surgeon(s):  Rusty Quintanilla M.D.    Anesthesiologist/Type of Anesthesia:  No anesthesia staff entered./Sedation    Surgical Staff:  Endoscopy Technician: Ant Obrien  Sedation/Monitoring Nurse: Elvira Gomez R.N.    Specimens:  1) gastric bx - rule out H Pylori    Estimated Blood Loss: None    Findings: 1) Large Duodenal bulb ulcer - clean based    Complications: None    Should be ok for regular diet and to go home on PPI and avoid all NSAIDS for now. Will send letter with biopsy results    WIll sign off    EMO        8/8/2017 12:27 PM Rusty Quintanilla

## 2017-08-08 NOTE — PROGRESS NOTES
Assumed care of patient at bedside report, vital signs stable afebrile, dr orders received and implemented, denies pain, has restless legs, 1 unit PRBC transfused for HH 6.8/20.7, tolerated transfusion well, no adverse reactions noted at this time, cont to monitor and treat as needed/required.    12 hr telemetry monitor summary: JOSE ANTONIO 0.14, QRS 0.08, QT 0.38, rates , no ectopy, SR-ST

## 2017-08-08 NOTE — DIETARY
Nutrition Services - Poor PO PTA     71 YO M admitted r/t black stools x 2 days and dizziness  PMH HTN, high cholesterol. Dx: upper GI bleed, possibly r/t NSAIDS per GI notes, blood loss anemia.    Pertinent Meds: protonix. Labs reviewed. No pressure areas or edema documented at this time. BMs are regular and Pt with no GI complaints at this time. Weight is 96.3kg and BMI is 31.34kg/m2, which is obese. Pt reports 2.3% weight loss x 12 days, which is not significant. Appearance is well-developed and mood is pleasant.    Pt c/o poor PO x 1 week PTA r/t feeling ill. His wife states that he is normally a good eater. GI following.     Pt is NPO at this time, S/P clear liquids      Plan/recommendation    If it is not medically feasible to advance to PO feedings within 48-72 hours, consider nutrition support to meet needs.    RD following

## 2017-08-08 NOTE — CARE PLAN
Problem: Bowel/Gastric:  Goal: Normal bowel function is maintained or improved  Outcome: NOT MET    Problem: Pain Management  Goal: Pain level will decrease to patient’s comfort goal  Outcome: PROGRESSING AS EXPECTED

## 2017-08-08 NOTE — CARE PLAN
Problem: Safety  Goal: Will remain free from falls  Outcome: PROGRESSING AS EXPECTED  Patient remains free from falls/injuries at this time, a/o x 4, fall interventions in place, cont to monitor    Problem: Pain Management  Goal: Pain level will decrease to patient’s comfort goal  Outcome: PROGRESSING AS EXPECTED  Patient denies pain discomfort at this time cont to monitor and treat as per dr bourne

## 2017-08-08 NOTE — PROGRESS NOTES
GI Consults  Re: melena    71yo from [place] for [place] was started on Aleve about 1 week ago and takes baby ASA daily.  He has been having dark stools for past 1 week and became dizzy 2 days ago. No GERD, no PUD, no dysphagia, no abd pain. Last colon about 8 years ago and no polyps.  No change in bowel habits.    Impression:  1.  Melena most likely related to NsAIDs  2.  Anemia, acute blood loss  3.  HTN  4.  Dyslipidemia    Recs:  1.  At this time Endo schedule is full tomorrow but we will call in am  2.  NPO at MN  3.  Cl liq diet  4.  Serial H&H, transfuse <7  5.  Pantoprazole gtt

## 2017-08-09 ENCOUNTER — PATIENT OUTREACH (OUTPATIENT)
Dept: HEALTH INFORMATION MANAGEMENT | Facility: OTHER | Age: 72
End: 2017-08-09

## 2017-08-09 VITALS
TEMPERATURE: 99.2 F | BODY MASS INDEX: 31.38 KG/M2 | HEART RATE: 79 BPM | HEIGHT: 69 IN | DIASTOLIC BLOOD PRESSURE: 68 MMHG | WEIGHT: 211.86 LBS | SYSTOLIC BLOOD PRESSURE: 138 MMHG | OXYGEN SATURATION: 96 % | RESPIRATION RATE: 18 BRPM

## 2017-08-09 PROBLEM — K26.9 DUODENAL ULCER: Status: ACTIVE | Noted: 2017-08-09

## 2017-08-09 LAB
BASOPHILS # BLD AUTO: 0.6 % (ref 0–1.8)
BASOPHILS # BLD: 0.06 K/UL (ref 0–0.12)
EOSINOPHIL # BLD AUTO: 0.28 K/UL (ref 0–0.51)
EOSINOPHIL NFR BLD: 2.9 % (ref 0–6.9)
ERYTHROCYTE [DISTWIDTH] IN BLOOD BY AUTOMATED COUNT: 47.8 FL (ref 35.9–50)
HCT VFR BLD AUTO: 23.8 % (ref 42–52)
HCT VFR BLD AUTO: 25.1 % (ref 42–52)
HGB BLD-MCNC: 7.8 G/DL (ref 14–18)
HGB BLD-MCNC: 8 G/DL (ref 14–18)
IMM GRANULOCYTES # BLD AUTO: 0.03 K/UL (ref 0–0.11)
IMM GRANULOCYTES NFR BLD AUTO: 0.3 % (ref 0–0.9)
LYMPHOCYTES # BLD AUTO: 2.25 K/UL (ref 1–4.8)
LYMPHOCYTES NFR BLD: 23.2 % (ref 22–41)
MCH RBC QN AUTO: 30.1 PG (ref 27–33)
MCHC RBC AUTO-ENTMCNC: 32.8 G/DL (ref 33.7–35.3)
MCV RBC AUTO: 91.9 FL (ref 81.4–97.8)
MONOCYTES # BLD AUTO: 0.93 K/UL (ref 0–0.85)
MONOCYTES NFR BLD AUTO: 9.6 % (ref 0–13.4)
NEUTROPHILS # BLD AUTO: 6.16 K/UL (ref 1.82–7.42)
NEUTROPHILS NFR BLD: 63.4 % (ref 44–72)
NRBC # BLD AUTO: 0 K/UL
NRBC BLD AUTO-RTO: 0 /100 WBC
PLATELET # BLD AUTO: 253 K/UL (ref 164–446)
PMV BLD AUTO: 10.3 FL (ref 9–12.9)
RBC # BLD AUTO: 2.59 M/UL (ref 4.7–6.1)
WBC # BLD AUTO: 9.7 K/UL (ref 4.8–10.8)

## 2017-08-09 PROCEDURE — A9270 NON-COVERED ITEM OR SERVICE: HCPCS | Performed by: INTERNAL MEDICINE

## 2017-08-09 PROCEDURE — 85014 HEMATOCRIT: CPT

## 2017-08-09 PROCEDURE — 85018 HEMOGLOBIN: CPT

## 2017-08-09 PROCEDURE — 700102 HCHG RX REV CODE 250 W/ 637 OVERRIDE(OP): Performed by: INTERNAL MEDICINE

## 2017-08-09 PROCEDURE — 36415 COLL VENOUS BLD VENIPUNCTURE: CPT

## 2017-08-09 PROCEDURE — 85025 COMPLETE CBC W/AUTO DIFF WBC: CPT

## 2017-08-09 PROCEDURE — 99239 HOSP IP/OBS DSCHRG MGMT >30: CPT | Performed by: INTERNAL MEDICINE

## 2017-08-09 RX ORDER — OMEPRAZOLE 20 MG/1
20 CAPSULE, DELAYED RELEASE ORAL 2 TIMES DAILY
Qty: 60 CAP | Refills: 0 | Status: SHIPPED | OUTPATIENT
Start: 2017-08-09

## 2017-08-09 RX ORDER — AMOXICILLIN 500 MG/1
1000 CAPSULE ORAL 2 TIMES DAILY
Qty: 28 CAP | Refills: 0 | Status: SHIPPED | OUTPATIENT
Start: 2017-08-09

## 2017-08-09 RX ORDER — CLARITHROMYCIN 500 MG/1
500 TABLET, COATED ORAL 2 TIMES DAILY
Qty: 28 TAB | Refills: 0 | Status: SHIPPED | OUTPATIENT
Start: 2017-08-09

## 2017-08-09 RX ADMIN — OMEPRAZOLE 20 MG: 20 CAPSULE, DELAYED RELEASE ORAL at 08:38

## 2017-08-09 ASSESSMENT — PAIN SCALES - GENERAL
PAINLEVEL_OUTOF10: 0

## 2017-08-09 ASSESSMENT — LIFESTYLE VARIABLES: EVER_SMOKED: NEVER

## 2017-08-09 NOTE — CARE PLAN
Problem: Safety  Goal: Will remain free from injury  Fall risk assessed, fall precautions in place, pt educated to call for assistance if needed, pt verbalizes understanding of fall risk.    Problem: Knowledge Deficit  Goal: Knowledge of disease process/condition, treatment plan, diagnostic tests, and medications will improve  Pt and family educated on POC, all questions answered in regards to disease process, treatment and diet. Pt and family verbalize understanding and voice no further questions at this time.

## 2017-08-09 NOTE — PROGRESS NOTES
Shift report received and assessment completed. No family at bedside. Pt indicates no distress. Call light placed within reach, bed in lowest position, and pt is up self. Pt educated to call. Will continue to monitor patient.

## 2017-08-09 NOTE — CARE PLAN
Problem: Communication  Goal: The ability to communicate needs accurately and effectively will improve  Intervention: Educate patient and significant other/support system about the plan of care, procedures, treatments, medications and allow for questions  Pt educated regarding plan of care and medications. All questions answered.       Problem: Safety  Goal: Will remain free from falls  Intervention: Assess risk factors for falls    08/08/17 2000   OTHER   Fall Risk Risk to Fall - 0 - 1 point   Risk for Injury-Any positive answers results in the pt being at high risk for fall related injury Not Applicable   Mobility Status Assessment 0-Ambulates & Transfers Independently. No Assistance Required   History of fall 0   Pt Calls for Assistance No assistance required

## 2017-08-09 NOTE — DISCHARGE INSTRUCTIONS
Discharge Instructions    Discharged to home by car with relative. Discharged via wheelchair, hospital escort: Yes.  Special equipment needed: Not Applicable    Be sure to schedule a follow-up appointment with your primary care doctor or any specialists as instructed.     Discharge Plan:   Diet Plan: Discussed  Activity Level: Discussed  Confirmed Follow up Appointment: Patient to Call and Schedule Appointment  Confirmed Symptoms Management: Discussed  Medication Reconciliation Updated: Yes  Influenza Vaccine Indication: Patient Refuses    I understand that a diet low in cholesterol, fat, and sodium is recommended for good health. Unless I have been given specific instructions below for another diet, I accept this instruction as my diet prescription.   Other diet: Regular Diet    Special Instructions: None    · Is patient discharged on Warfarin / Coumadin?   No     · Is patient Post Blood Transfusion?  No    Depression / Suicide Risk    As you are discharged from this [facility], it is important to learn how to keep safe from harming yourself.    Recognize the warning signs:  · Abrupt changes in personality, positive or negative- including increase in energy   · Giving away possessions  · Change in eating patterns- significant weight changes-  positive or negative  · Change in sleeping patterns- unable to sleep or sleeping all the time   · Unwillingness or inability to communicate  · Depression  · Unusual sadness, discouragement and loneliness  · Talk of wanting to die  · Neglect of personal appearance   · Rebelliousness- reckless behavior  · Withdrawal from people/activities they love  · Confusion- inability to concentrate     If you or a loved one observes any of these behaviors or has concerns about self-harm, here's what you can do:  · Talk about it- your feelings and reasons for harming yourself  · Remove any means that you might use to hurt yourself (examples: pills, rope, extension cords,  firearm)  · Get professional help from the community (Mental Health, Substance Abuse, psychological counseling)  · Do not be alone:Call your Safe Contact- someone whom you trust who will be there for you.  · Call your local CRISIS HOTLINE 546-8525 or 938-703-8084  · Call your local Children's Mobile Crisis Response Team [place] (872) 171-1809 or www.[employer]  · Call the toll free National Suicide Prevention Hotlines   · National Suicide Prevention Lifeline 626-133-OYBH (0951)  · [employer] Network 800-SUICIDE (588-5208)          Gastrointestinal Bleeding  Gastrointestinal (GI) bleeding means there is bleeding somewhere along the digestive tract, between the mouth and anus.  CAUSES   There are many different problems that can cause GI bleeding. Possible causes include:  · Esophagitis. This is inflammation, irritation, or swelling of the esophagus.  · Hemorrhoids. These are veins that are full of blood (engorged) in the rectum. They cause pain, inflammation, and may bleed.  · Anal fissures. These are areas of painful tearing which may bleed. They are often caused by passing hard stool.  · Diverticulosis. These are pouches that form on the colon over time, with age, and may bleed significantly.  · Diverticulitis. This is inflammation in areas with diverticulosis. It can cause pain, fever, and bloody stools, although bleeding is rare.  · Polyps and cancer. Colon cancer often starts out as precancerous polyps.  · Gastritis and ulcers. Bleeding from the upper gastrointestinal tract (near the stomach) may travel through the intestines and produce black, sometimes tarry, often bad smelling stools. In certain cases, if the bleeding is fast enough, the stools may not be black, but red. This condition may be life-threatening.  SYMPTOMS   · Vomiting bright red blood or material that looks like coffee grounds.  · Bloody, black, or tarry stools.  DIAGNOSIS   Your caregiver may diagnose your condition by  taking your history and performing a physical exam. More tests may be needed, including:  · X-rays and other imaging tests.  · Esophagogastroduodenoscopy (EGD). This test uses a flexible, lighted tube to look at your esophagus, stomach, and small intestine.  · Colonoscopy. This test uses a flexible, lighted tube to look at your colon.  TREATMENT   Treatment depends on the cause of your bleeding.   · For bleeding from the esophagus, stomach, small intestine, or colon, the caregiver doing your EGD or colonoscopy may be able to stop the bleeding as part of the procedure.  · Inflammation or infection of the colon can be treated with medicines.  · Many rectal problems can be treated with creams, suppositories, or warm baths.  · Surgery is sometimes needed.  · Blood transfusions are sometimes needed if you have lost a lot of blood.  If bleeding is slow, you may be allowed to go home. If there is a lot of bleeding, you will need to stay in the hospital for observation.  HOME CARE INSTRUCTIONS   · Take any medicines exactly as prescribed.  · Keep your stools soft by eating foods that are high in fiber. These foods include whole grains, legumes, fruits, and vegetables. Prunes (1 to 3 a day) work well for many people.  · Drink enough fluids to keep your urine clear or pale yellow.  SEEK IMMEDIATE MEDICAL CARE IF:   · Your bleeding increases.  · You feel lightheaded, weak, or you faint.  · You have severe cramps in your back or abdomen.  · You pass large blood clots in your stool.  · Your problems are getting worse.  MAKE SURE YOU:   · Understand these instructions.  · Will watch your condition.  · Will get help right away if you are not doing well or get worse.     This information is not intended to replace advice given to you by your health care provider. Make sure you discuss any questions you have with your health care provider.     Document Released: 12/15/2001 Document Revised: 12/04/2013 Document Reviewed:  11/26/2012  Elsevier Interactive Patient Education ©2016 [employer]

## 2017-08-09 NOTE — PROGRESS NOTES
Received report and assumed care of patient. Assessment complete, VSS, no complaints at this time. Discussed plan of care with patient and answered all questions. Fall precautions in place and call light in reach. Will continue to monitor.

## 2017-08-09 NOTE — DISCHARGE SUMMARY
CHIEF COMPLAINT ON ADMISSION  Chief Complaint   Patient presents with   • Bloody Stools     dark black for 1 wk   • Dizziness       CODE STATUS  Full Code    HPI & HOSPITAL COURSE  This is a 72 y.o. male here with melena/gen weakness for 1 week and was found to have Hgb of 7.  Patient was seen by GI and underwent EGD which revealed clean based duodenal bulb ulcer.  Patient was transfused with 1 unit of PRBC and his Hgb remains stable at time of discharge.  His pathology came back positive for H. Pylori for which he will be treated with 2 week course of Clarithromycin and Amoxicillin, along with PPI.  Patient is advised to stop taking ASA/NSAIDS.     Therefore, he is discharged in good and stable condition with close outpatient follow-up.    SPECIFIC OUTPATIENT FOLLOW-UP  PCP/GI    DISCHARGE PROBLEM LIST  Active Problems:    Upper GI bleed POA: Yes    Anemia due to blood loss, acute POA: Yes    HTN (hypertension) POA: Yes    Dyslipidemia POA: Yes    Duodenal ulcer POA: Yes  Resolved Problems:    * No resolved hospital problems. *      FOLLOW UP  No future appointments.  No follow-up provider specified.    MEDICATIONS ON DISCHARGE   Reyes Orourke   Home Medication Instructions WENDY:[id number]    Printed on:08/09/17 1224   Medication Information                      amoxicillin (AMOXIL) 500 MG Cap  Take 2 Caps by mouth 2 times a day.             atorvastatin (LIPITOR) 20 MG Tab  Take 20 mg by mouth every evening.             Calcium 600 MG Tab  Take 1 Tab by mouth every day.             clarithromycin (BIAXIN) 500 MG Tab  Take 1 Tab by mouth 2 times a day.             lisinopril (PRINIVIL) 10 MG Tab  Take 10 mg by mouth every day.             Omega-3 Fatty Acids (FISH OIL) 1000 MG Cap capsule  Take 1,000 mg by mouth 2 Times a Day.             omeprazole (PRILOSEC) 20 MG delayed-release capsule  Take 1 Cap by mouth 2 times a day.             therapeutic multivitamin-minerals (THERAGRAN-M) Tab  Take 1 Tab by mouth every  day.                 DIET  Orders Placed This Encounter   Procedures   • DIET ORDER     Standing Status: Standing      Number of Occurrences: 1      Standing Expiration Date:      Order Specific Question:  Diet:     Answer:  Regular [1]       ACTIVITY  As tolerated.  Weight bearing as tolerated      CONSULTATIONS  GI    PROCEDURES  EGD    LABORATORY  Lab Results   Component Value Date/Time    SODIUM 137 08/07/2017 10:40 AM    POTASSIUM 4.0 08/07/2017 10:40 AM    CHLORIDE 106 08/07/2017 10:40 AM    CO2 25 08/07/2017 10:40 AM    GLUCOSE 108* 08/07/2017 10:40 AM    BUN 34* 08/07/2017 10:40 AM    CREATININE 0.94 08/07/2017 10:40 AM        Lab Results   Component Value Date/Time    WBC 9.7 08/09/2017 04:52 AM    HEMOGLOBIN 8.0* 08/09/2017 10:26 AM    HEMATOCRIT 25.1* 08/09/2017 10:26 AM    PLATELET COUNT 253 08/09/2017 04:52 AM        Total time of the discharge process exceeds 40 minutes

## 2017-08-09 NOTE — PROGRESS NOTES
Discharge instructions, medications and follow-up reviewed with pt, pt verbalized understanding and denies questions. Discharge paperwork and prescriptions given to pt. PIV removed, TeleBox removed, pt awaiting transport.

## 2017-08-09 NOTE — PROGRESS NOTES
Pt sleeping, no signs of distress, no change from previous assessment.  Fall precautions in place, call light in reach. Will continue to monitor

## 2022-04-01 ENCOUNTER — APPOINTMENT (RX ONLY)
Dept: URBAN - METROPOLITAN AREA CLINIC 141 | Facility: CLINIC | Age: 77
Setting detail: DERMATOLOGY
End: 2022-04-01

## 2022-04-01 DIAGNOSIS — L57.8 OTHER SKIN CHANGES DUE TO CHRONIC EXPOSURE TO NONIONIZING RADIATION: ICD-10-CM | Status: INADEQUATELY CONTROLLED

## 2022-04-01 DIAGNOSIS — D22 MELANOCYTIC NEVI: ICD-10-CM

## 2022-04-01 DIAGNOSIS — L81.4 OTHER MELANIN HYPERPIGMENTATION: ICD-10-CM

## 2022-04-01 DIAGNOSIS — L57.0 ACTINIC KERATOSIS: ICD-10-CM

## 2022-04-01 DIAGNOSIS — L82.1 OTHER SEBORRHEIC KERATOSIS: ICD-10-CM

## 2022-04-01 PROBLEM — D22.5 MELANOCYTIC NEVI OF TRUNK: Status: ACTIVE | Noted: 2022-04-01

## 2022-04-01 PROCEDURE — ? TREATMENT REGIMEN

## 2022-04-01 PROCEDURE — 17003 DESTRUCT PREMALG LES 2-14: CPT

## 2022-04-01 PROCEDURE — ? COUNSELING

## 2022-04-01 PROCEDURE — 17000 DESTRUCT PREMALG LESION: CPT

## 2022-04-01 PROCEDURE — ? LIQUID NITROGEN

## 2022-04-01 PROCEDURE — 99203 OFFICE O/P NEW LOW 30 MIN: CPT | Mod: 25

## 2022-04-01 ASSESSMENT — LOCATION DETAILED DESCRIPTION DERM
LOCATION DETAILED: RIGHT PROXIMAL DORSAL FOREARM
LOCATION DETAILED: RIGHT CENTRAL MALAR CHEEK
LOCATION DETAILED: RIGHT DISTAL DORSAL FOREARM
LOCATION DETAILED: RIGHT DISTAL RADIAL DORSAL FOREARM
LOCATION DETAILED: RIGHT SUPERIOR HELIX
LOCATION DETAILED: LEFT SUPERIOR UPPER BACK
LOCATION DETAILED: RIGHT PROXIMAL RADIAL DORSAL FOREARM
LOCATION DETAILED: LEFT DISTAL DORSAL FOREARM
LOCATION DETAILED: LEFT PROXIMAL RADIAL DORSAL FOREARM
LOCATION DETAILED: NASAL DORSUM
LOCATION DETAILED: LEFT CENTRAL MALAR CHEEK

## 2022-04-01 ASSESSMENT — LOCATION SIMPLE DESCRIPTION DERM
LOCATION SIMPLE: LEFT CHEEK
LOCATION SIMPLE: RIGHT CHEEK
LOCATION SIMPLE: RIGHT EAR
LOCATION SIMPLE: RIGHT FOREARM
LOCATION SIMPLE: LEFT UPPER BACK
LOCATION SIMPLE: NOSE
LOCATION SIMPLE: LEFT FOREARM

## 2022-04-01 ASSESSMENT — LOCATION ZONE DERM
LOCATION ZONE: NOSE
LOCATION ZONE: TRUNK
LOCATION ZONE: EAR
LOCATION ZONE: FACE
LOCATION ZONE: ARM

## 2022-04-01 NOTE — PROCEDURE: TREATMENT REGIMEN
Detail Level: Simple
Plan: Severe damage on arms. Will consider Efudex in the future during the fall.

## 2022-04-01 NOTE — PROCEDURE: LIQUID NITROGEN
Consent: The patient's consent was obtained including but not limited to risks of crusting, scabbing, blistering, scarring, darker or lighter pigmentary change, recurrence, incomplete removal and infection.
Render Post-Care Instructions In Note?: no
Total Number Of Aks Treated: 12
Detail Level: Zone
Post-Care Instructions: I reviewed with the patient in detail post-care instructions. Patient is to wear sunprotection, and avoid picking at any of the treated lesions. Pt may apply Vaseline to crusted or scabbing areas.
Number Of Freeze-Thaw Cycles: 2 freeze-thaw cycles
Duration Of Freeze Thaw-Cycle (Seconds): 15

## 2022-04-01 NOTE — PROCEDURE: MIPS QUALITY
Quality 47: Advance Care Plan: Advance Care Planning discussed and documented; advance care plan or surrogate decision maker documented in the medical record.
Detail Level: Detailed
Quality 130: Documentation Of Current Medications In The Medical Record: Current Medications Documented
Quality 111:Pneumonia Vaccination Status For Older Adults: Pneumococcal Vaccination not Administered or Previously Received, Reason not Otherwise Specified

## (undated) DEVICE — SPONGE GAUZE NON-STERILE 4X4 - (2000/CA 10PK/CA)

## (undated) DEVICE — KIT CUSTOM PROCEDURE SINGLE FOR ENDO  (15/CA)

## (undated) DEVICE — TUBE CONNECTING SUCTION - CLEAR PLASTIC STERILE 72 IN (50EA/CA)

## (undated) DEVICE — GOWN SURGEONS X-LARGE - DISP. (30/CA)

## (undated) DEVICE — BITE BLOCK ADULT 60FR (100EA/CA)

## (undated) DEVICE — SYRINGE 6 CC 20 GA X 1 1/2 - NDL SAFETY  (50/BX)

## (undated) DEVICE — SYRINGE 3 CC 22 GA X 1-1/2 - NDL SAFETY (50/BX 8BX/CA)

## (undated) DEVICE — SOD. CHL 10CC SYRINGE PREFILL - W/10 CC (30/BX)

## (undated) DEVICE — BASIN EMESIS DISP. - (250/CA)

## (undated) DEVICE — FORCEP RADIAL JAW 4 STANDARD CAPACITY W/NEEDLE 240CM (40EA/BX)

## (undated) DEVICE — CANISTER SUCTION RIGID RED 1500CC (40EA/CA)

## (undated) DEVICE — SYRINGE DISP. 50CC LS - (40/BX)